# Patient Record
Sex: MALE
[De-identification: names, ages, dates, MRNs, and addresses within clinical notes are randomized per-mention and may not be internally consistent; named-entity substitution may affect disease eponyms.]

---

## 2017-08-23 PROBLEM — Z00.00 ENCOUNTER FOR PREVENTIVE HEALTH EXAMINATION: Status: ACTIVE | Noted: 2017-08-23

## 2017-08-29 ENCOUNTER — APPOINTMENT (OUTPATIENT)
Dept: DERMATOLOGY | Facility: CLINIC | Age: 22
End: 2017-08-29

## 2017-10-16 ENCOUNTER — APPOINTMENT (OUTPATIENT)
Dept: DERMATOLOGY | Facility: CLINIC | Age: 22
End: 2017-10-16
Payer: COMMERCIAL

## 2017-10-16 PROCEDURE — 99201 OFFICE OUTPATIENT NEW 10 MINUTES: CPT | Mod: 25

## 2017-10-16 PROCEDURE — 17110 DESTRUCTION B9 LES UP TO 14: CPT

## 2017-10-30 ENCOUNTER — TRANSCRIPTION ENCOUNTER (OUTPATIENT)
Age: 22
End: 2017-10-30

## 2024-10-02 ENCOUNTER — APPOINTMENT (OUTPATIENT)
Dept: INTERNAL MEDICINE | Facility: CLINIC | Age: 29
End: 2024-10-02

## 2025-02-17 ENCOUNTER — EMERGENCY (EMERGENCY)
Facility: HOSPITAL | Age: 30
LOS: 1 days | Discharge: HOME | End: 2025-02-17
Attending: STUDENT IN AN ORGANIZED HEALTH CARE EDUCATION/TRAINING PROGRAM
Payer: COMMERCIAL

## 2025-02-17 VITALS
RESPIRATION RATE: 16 BRPM | TEMPERATURE: 98 F | SYSTOLIC BLOOD PRESSURE: 143 MMHG | WEIGHT: 210.1 LBS | OXYGEN SATURATION: 100 % | HEART RATE: 101 BPM | DIASTOLIC BLOOD PRESSURE: 84 MMHG

## 2025-02-17 VITALS
DIASTOLIC BLOOD PRESSURE: 83 MMHG | RESPIRATION RATE: 16 BRPM | TEMPERATURE: 98 F | OXYGEN SATURATION: 100 % | HEART RATE: 99 BPM | SYSTOLIC BLOOD PRESSURE: 138 MMHG

## 2025-02-17 NOTE — ED ADULT NURSE NOTE - OBJECTIVE STATEMENT
29y m A&OX4 c/o testicular pain. Pt states around 10pm he was  intimate with his partner when he had the sudden onset of abdominal discomfort below his belly button that radiated down to his testicles with nausea. Pt states the pain gets worse with movement. Pt was concerned for testicular torsion and came to the ED for evaluation. Upon assessment pt well appearing however endorsing aching and heaviness to testes. Denies any blood in urine, difficulty urinating, pain with urination,V/D/CP/SOB/Gi/Gu symptoms. No PMH. PSH of  right ACL repair. VSS

## 2025-02-17 NOTE — ED ADULT NURSE NOTE - GENITOURINARY ASSESSMENT
Patient notified of test results and recommendations; phone number for [facility] lab provided. Patient verbalizes understanding and denies questions or needs at this time.       ----- Message from Shanna Castillo DO sent at 8/11/2021  3:56 PM CDT -----  Beta hcg is 11. Please repeat in 1 week     Order placed   - - -

## 2025-02-18 DIAGNOSIS — Z98.890 OTHER SPECIFIED POSTPROCEDURAL STATES: Chronic | ICD-10-CM

## 2025-02-18 LAB
APPEARANCE UR: CLEAR — SIGNIFICANT CHANGE UP
BACTERIA # UR AUTO: NEGATIVE /HPF — SIGNIFICANT CHANGE UP
BILIRUB UR-MCNC: NEGATIVE — SIGNIFICANT CHANGE UP
CAST: 0 /LPF — SIGNIFICANT CHANGE UP (ref 0–4)
COLOR SPEC: YELLOW — SIGNIFICANT CHANGE UP
CULTURE RESULTS: NO GROWTH — SIGNIFICANT CHANGE UP
DIFF PNL FLD: NEGATIVE — SIGNIFICANT CHANGE UP
GLUCOSE UR QL: NEGATIVE MG/DL — SIGNIFICANT CHANGE UP
KETONES UR-MCNC: ABNORMAL MG/DL
LEUKOCYTE ESTERASE UR-ACNC: NEGATIVE — SIGNIFICANT CHANGE UP
NITRITE UR-MCNC: NEGATIVE — SIGNIFICANT CHANGE UP
PH UR: 6 — SIGNIFICANT CHANGE UP (ref 5–8)
PROT UR-MCNC: SIGNIFICANT CHANGE UP MG/DL
RBC CASTS # UR COMP ASSIST: 1 /HPF — SIGNIFICANT CHANGE UP (ref 0–4)
SP GR SPEC: 1.03 — SIGNIFICANT CHANGE UP (ref 1–1.03)
SPECIMEN SOURCE: SIGNIFICANT CHANGE UP
SQUAMOUS # UR AUTO: 0 /HPF — SIGNIFICANT CHANGE UP (ref 0–5)
UROBILINOGEN FLD QL: 1 MG/DL — SIGNIFICANT CHANGE UP (ref 0.2–1)
WBC UR QL: 1 /HPF — SIGNIFICANT CHANGE UP (ref 0–5)

## 2025-02-18 RX ORDER — ACETAMINOPHEN 160 MG/5ML
975 SUSPENSION ORAL ONCE
Refills: 0 | Status: COMPLETED | OUTPATIENT
Start: 2025-02-18 | End: 2025-02-18

## 2025-02-18 RX ADMIN — ACETAMINOPHEN 975 MILLIGRAM(S): 160 SUSPENSION ORAL at 00:31

## 2025-02-18 NOTE — ED PROVIDER NOTE - NSFOLLOWUPINSTRUCTIONS_ED_ALL_ED_FT
- You were seen in the emergency department today for testicular pain. Your urine did not have any infection nor did your ultrasound have any acute findings such as epididymitis or torsion.    - Lab and imaging results, if performed, were discussed with you along with your discharge diagnosis    - Follow up with your doctor in 1 week - bring copies of your results if you were given. If you are supposed  to follow up with a specialist, please bring your results with you as well.     - Return to the ED for any new, worsening, or concerning symptoms to you    - Continue all prescribed medications.    - Take ibuprofen/tylenol as directed as needed for pain.     - Rest and keep yourself hydrated with fluids.

## 2025-02-18 NOTE — ED PROVIDER NOTE - ATTENDING CONTRIBUTION TO CARE
I, Dr. Hannah Desir, have personally performed a face to face medical and diagnostic evaluation of the patient. I have discussed with and reviewed the Resident's and/or ACP's and/or Medical/PA/NP student's note and agree with the History, ROS, Physical Exam and MDM unless otherwise indicated. A brief summary of my personal evaluation and impression can be found below.    HPI: See above.    PE: Well-appearing, clear lungs, normal heart sounds, abdomen soft/nontender/nondistended, no rebound or guarding,  exam performed with Dr. Dash at bedside as chaperone, normal-appearing external genitalia, +bilateral cremasteric reflexes, minimal tenderness of superior aspect of bilateral testes, no swelling, no masses.    MDM: Healthy male presenting with testicular pain starting tonight. No trauma to the area, no urinary symptoms. Exam fairly benign.  Do not suspect torsion.  Possible epididymitis versus UTI.  Patient is sexually active with male partner but denies urinary discharge and has recent STI testing which was all negative.  Offered repeat but declined.

## 2025-02-18 NOTE — ED PROVIDER NOTE - PHYSICAL EXAMINATION
Bryn Dash DO (PGY1)   Physical Exam:    Gen: NAD, AOx3  Head: NCAT  HEENT: EOMI, PEERLA, pink and moist mucous membranes  Lung: CTAB, no respiratory distress, no wheezes/rhonchi/rales B/L  CV: RRR, no murmurs, rubs or gallops  Abd: soft, NT, ND, no guarding, no rigidity, no rebound tenderness, no CVA tenderness   MSK: no visible deformities, ROM normal in UE/LE, no back pain   testicular: Bilateral testicles are mildly erythematous, no Prehn sign, cremasteric reflexes intact, no overlying gangrene or lacerations or abrasions, no discharge noted.  exam was done or the chaperone of Dr. Desir  Neuro: No focal sensory or motor deficits. Sensation intact to light touch all extremities.  Skin: Warm, well perfused, no rash, no leg swelling  Psych: normal affect, calm

## 2025-02-18 NOTE — ED PROVIDER NOTE - OBJECTIVE STATEMENT
29-year-old male with no past medical history presents today for testicular pain.  He notes that he was getting intimate with a partner when he developed some sudden bilateral testicular pain, with some mild radiation to the suprapubic area and felt fine nauseous about 5 minutes afterwards.   denies any concern for STDs, declines STD testing today. He denies any preceding symptoms, vomiting,  Chest pain, shortness of breath, other abdominal pains, changes in abdominal pain with eating, had previous STD testing ready and has only been with 1 partner for some time. is on PrEP therapy

## 2025-02-18 NOTE — ED PROVIDER NOTE - CLINICAL SUMMARY MEDICAL DECISION MAKING FREE TEXT BOX
29-year-old male with no past medical history presents today for testicular pain.  Physical examination remarkable for well-appearing male, testicular exam relatively benign bilateral testicles on the overlying skin are free moving and mobile, mildly erythematous bilaterally, no discharge noted or lacerations, differential diagnosis includes was not limited to epididymitis, muscle strain/sprain, epididymal hypertension, will rule out any form of the testicular torsion although exam is relatively benign,  plan includes urinalysis, symptomatic management for the pain and testicular ultrasound and reassess

## 2025-02-18 NOTE — ED PROVIDER NOTE - PATIENT PORTAL LINK FT
You can access the FollowMyHealth Patient Portal offered by [facility] by registering at the following website: [email or web address]. By joining [employer]’s FollowMyHealth portal, you will also be able to view your health information using other applications (apps) compatible with our system.